# Patient Record
Sex: MALE | Race: WHITE | NOT HISPANIC OR LATINO | URBAN - METROPOLITAN AREA
[De-identification: names, ages, dates, MRNs, and addresses within clinical notes are randomized per-mention and may not be internally consistent; named-entity substitution may affect disease eponyms.]

---

## 2024-01-28 VITALS
WEIGHT: 205.03 LBS | DIASTOLIC BLOOD PRESSURE: 82 MMHG | SYSTOLIC BLOOD PRESSURE: 136 MMHG | RESPIRATION RATE: 16 BRPM | OXYGEN SATURATION: 96 % | TEMPERATURE: 99 F | HEART RATE: 97 BPM | HEIGHT: 72 IN

## 2024-01-28 LAB
ALBUMIN SERPL ELPH-MCNC: 4.1 G/DL — SIGNIFICANT CHANGE UP (ref 3.4–5)
ALP SERPL-CCNC: 74 U/L — SIGNIFICANT CHANGE UP (ref 40–120)
ALT FLD-CCNC: 22 U/L — SIGNIFICANT CHANGE UP (ref 12–42)
ANION GAP SERPL CALC-SCNC: 8 MMOL/L — LOW (ref 9–16)
APTT BLD: 25 SEC — SIGNIFICANT CHANGE UP (ref 24.5–35.6)
AST SERPL-CCNC: 22 U/L — SIGNIFICANT CHANGE UP (ref 15–37)
BASOPHILS # BLD AUTO: 0.06 K/UL — SIGNIFICANT CHANGE UP (ref 0–0.2)
BASOPHILS NFR BLD AUTO: 0.7 % — SIGNIFICANT CHANGE UP (ref 0–2)
BILIRUB SERPL-MCNC: 0.3 MG/DL — SIGNIFICANT CHANGE UP (ref 0.2–1.2)
BUN SERPL-MCNC: 23 MG/DL — SIGNIFICANT CHANGE UP (ref 7–23)
CALCIUM SERPL-MCNC: 9.2 MG/DL — SIGNIFICANT CHANGE UP (ref 8.5–10.5)
CHLORIDE SERPL-SCNC: 106 MMOL/L — SIGNIFICANT CHANGE UP (ref 96–108)
CO2 SERPL-SCNC: 27 MMOL/L — SIGNIFICANT CHANGE UP (ref 22–31)
CREAT SERPL-MCNC: 1.41 MG/DL — HIGH (ref 0.5–1.3)
EGFR: 69 ML/MIN/1.73M2 — SIGNIFICANT CHANGE UP
EOSINOPHIL # BLD AUTO: 0.06 K/UL — SIGNIFICANT CHANGE UP (ref 0–0.5)
EOSINOPHIL NFR BLD AUTO: 0.7 % — SIGNIFICANT CHANGE UP (ref 0–6)
GLUCOSE SERPL-MCNC: 86 MG/DL — SIGNIFICANT CHANGE UP (ref 70–99)
HCT VFR BLD CALC: 40 % — SIGNIFICANT CHANGE UP (ref 39–50)
HGB BLD-MCNC: 13.5 G/DL — SIGNIFICANT CHANGE UP (ref 13–17)
IMM GRANULOCYTES NFR BLD AUTO: 0.6 % — SIGNIFICANT CHANGE UP (ref 0–0.9)
INR BLD: 1.07 — SIGNIFICANT CHANGE UP (ref 0.85–1.18)
LYMPHOCYTES # BLD AUTO: 1.32 K/UL — SIGNIFICANT CHANGE UP (ref 1–3.3)
LYMPHOCYTES # BLD AUTO: 15.1 % — SIGNIFICANT CHANGE UP (ref 13–44)
MCHC RBC-ENTMCNC: 31 PG — SIGNIFICANT CHANGE UP (ref 27–34)
MCHC RBC-ENTMCNC: 33.8 GM/DL — SIGNIFICANT CHANGE UP (ref 32–36)
MCV RBC AUTO: 91.7 FL — SIGNIFICANT CHANGE UP (ref 80–100)
MONOCYTES # BLD AUTO: 0.91 K/UL — HIGH (ref 0–0.9)
MONOCYTES NFR BLD AUTO: 10.4 % — SIGNIFICANT CHANGE UP (ref 2–14)
NEUTROPHILS # BLD AUTO: 6.33 K/UL — SIGNIFICANT CHANGE UP (ref 1.8–7.4)
NEUTROPHILS NFR BLD AUTO: 72.5 % — SIGNIFICANT CHANGE UP (ref 43–77)
NRBC # BLD: 0 /100 WBCS — SIGNIFICANT CHANGE UP (ref 0–0)
PLATELET # BLD AUTO: 186 K/UL — SIGNIFICANT CHANGE UP (ref 150–400)
POTASSIUM SERPL-MCNC: 4 MMOL/L — SIGNIFICANT CHANGE UP (ref 3.5–5.3)
POTASSIUM SERPL-SCNC: 4 MMOL/L — SIGNIFICANT CHANGE UP (ref 3.5–5.3)
PROT SERPL-MCNC: 7.6 G/DL — SIGNIFICANT CHANGE UP (ref 6.4–8.2)
PROTHROM AB SERPL-ACNC: 11.7 SEC — SIGNIFICANT CHANGE UP (ref 9.5–13)
RBC # BLD: 4.36 M/UL — SIGNIFICANT CHANGE UP (ref 4.2–5.8)
RBC # FLD: 12.8 % — SIGNIFICANT CHANGE UP (ref 10.3–14.5)
SODIUM SERPL-SCNC: 141 MMOL/L — SIGNIFICANT CHANGE UP (ref 132–145)
WBC # BLD: 8.73 K/UL — SIGNIFICANT CHANGE UP (ref 3.8–10.5)
WBC # FLD AUTO: 8.73 K/UL — SIGNIFICANT CHANGE UP (ref 3.8–10.5)

## 2024-01-28 PROCEDURE — 73080 X-RAY EXAM OF ELBOW: CPT | Mod: 26,RT

## 2024-01-28 PROCEDURE — 73200 CT UPPER EXTREMITY W/O DYE: CPT | Mod: 26,RT

## 2024-01-28 PROCEDURE — 73060 X-RAY EXAM OF HUMERUS: CPT | Mod: 26,RT

## 2024-01-28 PROCEDURE — 99285 EMERGENCY DEPT VISIT HI MDM: CPT

## 2024-01-28 RX ORDER — MORPHINE SULFATE 50 MG/1
4 CAPSULE, EXTENDED RELEASE ORAL ONCE
Refills: 0 | Status: DISCONTINUED | OUTPATIENT
Start: 2024-01-28 | End: 2024-01-28

## 2024-01-28 RX ORDER — KETOROLAC TROMETHAMINE 30 MG/ML
30 SYRINGE (ML) INJECTION ONCE
Refills: 0 | Status: DISCONTINUED | OUTPATIENT
Start: 2024-01-28 | End: 2024-01-28

## 2024-01-28 RX ORDER — SODIUM CHLORIDE 9 MG/ML
1000 INJECTION INTRAMUSCULAR; INTRAVENOUS; SUBCUTANEOUS ONCE
Refills: 0 | Status: COMPLETED | OUTPATIENT
Start: 2024-01-28 | End: 2024-01-28

## 2024-01-28 RX ADMIN — Medication 30 MILLIGRAM(S): at 20:00

## 2024-01-28 RX ADMIN — SODIUM CHLORIDE 1000 MILLILITER(S): 9 INJECTION INTRAMUSCULAR; INTRAVENOUS; SUBCUTANEOUS at 23:24

## 2024-01-28 RX ADMIN — MORPHINE SULFATE 4 MILLIGRAM(S): 50 CAPSULE, EXTENDED RELEASE ORAL at 21:53

## 2024-01-28 RX ADMIN — MORPHINE SULFATE 4 MILLIGRAM(S): 50 CAPSULE, EXTENDED RELEASE ORAL at 22:42

## 2024-01-28 NOTE — ED PROVIDER NOTE - NS ED ATTENDING STATEMENT MOD
I have seen and examined this patient and fully participated in the care of this patient as the teaching attending.  The service was shared with the YESI.  I reviewed and verified the documentation.

## 2024-01-28 NOTE — ED PROVIDER NOTE - CLINICAL SUMMARY MEDICAL DECISION MAKING FREE TEXT BOX
29 yo male c/o right elbow injury occurred just prior to ED arrival. he reports he threw a dodge ball and felt like he dislocated his right elbow, unable to range his elbow. no other injuries. xrays ordered r/o fracture, analgesia ordered.      //xrays show right mid humerus shaft fracture displaced. closed. discussed with ortho on call Dr. Best, will obtain humerus xrays and CT, admit to regional ortho for surgical intervention, patient agrees with plan 27 yo male c/o right elbow injury occurred just prior to ED arrival. he reports he threw a dodge ball and felt like he dislocated his right elbow, unable to range his elbow. no other injuries. xrays ordered r/o fracture, analgesia ordered.      //xrays show right mid humerus shaft fracture displaced. closed. discussed with ortho on call Dr. Best, will obtain humerus xrays and CT, admit to regional ortho for surgical intervention, placed in posterior splint for transport. patient agrees with plan

## 2024-01-28 NOTE — ED PROVIDER NOTE - ATTENDING CONTRIBUTION TO CARE
I viewed the xray and CT images and there is a spiral humeral fracture.   I personally splinted the patient in long arm spling and admit to Dr Virk

## 2024-01-28 NOTE — H&P ADULT - HISTORY OF PRESENT ILLNESS
28M presented to [facility] ED with R elbow pain after throwing a dodge ball. Felt elbow snap during wind up. Has deformity and ecchymosis and unable to range his R elbow. Denies numbness/tingling.   28M presented to [place] ED with R elbow pain after throwing a dodge ball. Felt elbow snap during wind up. No deformity or ecchymosis, but unable to range his R elbow. Denies numbness/tingling.   28M presented to [facility] ED with R elbow pain after throwing a dodge ball. Felt elbow snap during follow through. No deformity or ecchymosis, but unable to range his R elbow. Denies numbness/tingling.

## 2024-01-28 NOTE — H&P ADULT - ASSESSMENT
A/P: 28M with displaced distal third humeral shaft fracture    -NWB RUE  -Posterior slab splint  -Pre-op and add-on for distal humerus ORIF

## 2024-01-28 NOTE — ED PROVIDER NOTE - OBJECTIVE STATEMENT
27 yo female c/o right elbow injury occurred just prior to ED arrival. he reports he threw a dodge ball and felt like he dislocated his right elbow, unable to range his elbow. 27 yo male c/o right elbow injury occurred just prior to ED arrival. he reports he threw a dodge ball and felt like he dislocated his right elbow, unable to range his elbow. no other injuries.

## 2024-01-28 NOTE — H&P ADULT - NSHPPHYSICALEXAM_GEN_ALL_CORE
CONSTITUTIONAL: Well-appearing; well-nourished; in no apparent distress.   EYES:  conjunctiva and sclera clear  ENT: normal nose; no rhinorrhea; normal pharynx with no erythema or lesions.   NECK: Supple; non-tender;   CARDIOVASCULAR: Normal S1, S2; no murmurs, rubs, or gallops. Regular rate and rhythm.   RESPIRATORY: Breathing easily; breath sounds clear and equal bilaterally; no wheezes, rhonchi, or rales.  GI: soft nondistended nontender.  SKIN: Normal for age and race; warm; dry; good turgor; no apparent lesions or rash.   NEURO: A & O x 3; face symmetric; grossly unremarkable.   PSYCHOLOGICAL: The patient’s mood and manner are appropriate.    RUE: normal appearance, unable to range his elbow, +ttp elbow and humerus. Soft compartments. no hand/wrist or shoulder tenderness. no sensory defiicts. distal pulses intact.

## 2024-01-28 NOTE — ED ADULT NURSE NOTE - NSFALLUNIVINTERV_ED_ALL_ED
Bed/Stretcher in lowest position, wheels locked, appropriate side rails in place/Call bell, personal items and telephone in reach/Instruct patient to call for assistance before getting out of bed/chair/stretcher/Non-slip footwear applied when patient is off stretcher/[place] to call system/Physically safe environment - no spills, clutter or unnecessary equipment/Purposeful proactive rounding/Room/bathroom lighting operational, light cord in reach

## 2024-01-28 NOTE — ED PROVIDER NOTE - PHYSICAL EXAMINATION
CONSTITUTIONAL: Well-appearing; well-nourished; in no apparent distress.   	HEAD: Normocephalic; atraumatic.   	RUE: normal appearance, unable to range his elbow, +ttp elbow, dpi. soft compartments. no hand/wrist or shoulder tenderness.   	NEURO: A & O x 3; face symmetric; grossly unremarkable.   PSYCHOLOGICAL: The patient’s mood and manner are appropriate. CONSTITUTIONAL: Well-appearing; well-nourished; in no apparent distress.   	EYES:  conjunctiva and sclera clear  	ENT: normal nose; no rhinorrhea; normal pharynx with no erythema or lesions.   	NECK: Supple; non-tender;   	CARDIOVASCULAR: Normal S1, S2; no murmurs, rubs, or gallops. Regular rate and rhythm.   	RESPIRATORY: Breathing easily; breath sounds clear and equal bilaterally; no wheezes, rhonchi, or rales.  	GI: soft nondistended nontender.  Back: no midline tenderness or bony stepoffs.   RUE: normal appearance, unable to range his elbow, +ttp elbow and humerus, dpi. soft compartments. no hand/wrist or shoulder tenderness. no sensory defiicts.   	SKIN: Normal for age and race; warm; dry; good turgor; no apparent lesions or rash.   	NEURO: A & O x 3; face symmetric; grossly unremarkable.   PSYCHOLOGICAL: The patient’s mood and manner are appropriate.

## 2024-01-28 NOTE — ED ADULT NURSE NOTE - OBJECTIVE STATEMENT
Pt presents to ED A&Ox4 with c/o 10/10 right elbow pain. Pt states sustaining injury while playing dodgeball. CMS intact in the right upper extremity. Pt unable to utilize full ROM. Pt presents to ED A&Ox4 with c/o 10/10 right elbow pain. Pt states sustaining injury while playing dodgeball. CMS intact in right upper extremity. Pt unable to utilize full ROM of right elbow. Upon observation, no visible deformities, redness, or bruising is present in right elbow and no other injuries are present.

## 2024-01-29 ENCOUNTER — INPATIENT (INPATIENT)
Facility: HOSPITAL | Age: 29
LOS: 0 days | Discharge: HOME | DRG: 493 | End: 2024-01-29
Attending: ORTHOPAEDIC SURGERY | Admitting: ORTHOPAEDIC SURGERY
Payer: COMMERCIAL

## 2024-01-29 VITALS
OXYGEN SATURATION: 97 % | HEART RATE: 62 BPM | RESPIRATION RATE: 17 BRPM | TEMPERATURE: 98 F | DIASTOLIC BLOOD PRESSURE: 59 MMHG | SYSTOLIC BLOOD PRESSURE: 113 MMHG

## 2024-01-29 DIAGNOSIS — N17.9 ACUTE KIDNEY FAILURE, UNSPECIFIED: ICD-10-CM

## 2024-01-29 DIAGNOSIS — Z01.818 ENCOUNTER FOR OTHER PREPROCEDURAL EXAMINATION: ICD-10-CM

## 2024-01-29 LAB
ANION GAP SERPL CALC-SCNC: 8 MMOL/L — SIGNIFICANT CHANGE UP (ref 5–17)
APPEARANCE UR: CLEAR — SIGNIFICANT CHANGE UP
APTT BLD: 26.1 SEC — SIGNIFICANT CHANGE UP (ref 24.5–35.6)
BILIRUB UR-MCNC: NEGATIVE — SIGNIFICANT CHANGE UP
BLD GP AB SCN SERPL QL: NEGATIVE — SIGNIFICANT CHANGE UP
BLD GP AB SCN SERPL QL: NEGATIVE — SIGNIFICANT CHANGE UP
BUN SERPL-MCNC: 24 MG/DL — HIGH (ref 7–23)
CALCIUM SERPL-MCNC: 8.9 MG/DL — SIGNIFICANT CHANGE UP (ref 8.4–10.5)
CHLORIDE SERPL-SCNC: 105 MMOL/L — SIGNIFICANT CHANGE UP (ref 96–108)
CO2 SERPL-SCNC: 26 MMOL/L — SIGNIFICANT CHANGE UP (ref 22–31)
COLOR SPEC: YELLOW — SIGNIFICANT CHANGE UP
CREAT SERPL-MCNC: 1.16 MG/DL — SIGNIFICANT CHANGE UP (ref 0.5–1.3)
DIFF PNL FLD: NEGATIVE — SIGNIFICANT CHANGE UP
EGFR: 87 ML/MIN/1.73M2 — SIGNIFICANT CHANGE UP
GLUCOSE SERPL-MCNC: 137 MG/DL — HIGH (ref 70–99)
GLUCOSE UR QL: NEGATIVE MG/DL — SIGNIFICANT CHANGE UP
HCT VFR BLD CALC: 34.6 % — LOW (ref 39–50)
HGB BLD-MCNC: 12 G/DL — LOW (ref 13–17)
INR BLD: 1.04 — SIGNIFICANT CHANGE UP (ref 0.85–1.18)
KETONES UR-MCNC: ABNORMAL MG/DL
LEUKOCYTE ESTERASE UR-ACNC: NEGATIVE — SIGNIFICANT CHANGE UP
MCHC RBC-ENTMCNC: 30.8 PG — SIGNIFICANT CHANGE UP (ref 27–34)
MCHC RBC-ENTMCNC: 34.7 GM/DL — SIGNIFICANT CHANGE UP (ref 32–36)
MCV RBC AUTO: 88.9 FL — SIGNIFICANT CHANGE UP (ref 80–100)
NITRITE UR-MCNC: NEGATIVE — SIGNIFICANT CHANGE UP
NRBC # BLD: 0 /100 WBCS — SIGNIFICANT CHANGE UP (ref 0–0)
PH UR: 6 — SIGNIFICANT CHANGE UP (ref 5–8)
PLATELET # BLD AUTO: 163 K/UL — SIGNIFICANT CHANGE UP (ref 150–400)
POTASSIUM SERPL-MCNC: 3.7 MMOL/L — SIGNIFICANT CHANGE UP (ref 3.5–5.3)
POTASSIUM SERPL-SCNC: 3.7 MMOL/L — SIGNIFICANT CHANGE UP (ref 3.5–5.3)
PROT UR-MCNC: NEGATIVE MG/DL — SIGNIFICANT CHANGE UP
PROTHROM AB SERPL-ACNC: 11.8 SEC — SIGNIFICANT CHANGE UP (ref 9.5–13)
RBC # BLD: 3.89 M/UL — LOW (ref 4.2–5.8)
RBC # FLD: 13 % — SIGNIFICANT CHANGE UP (ref 10.3–14.5)
RH IG SCN BLD-IMP: NEGATIVE — SIGNIFICANT CHANGE UP
RH IG SCN BLD-IMP: NEGATIVE — SIGNIFICANT CHANGE UP
SODIUM SERPL-SCNC: 139 MMOL/L — SIGNIFICANT CHANGE UP (ref 135–145)
SP GR SPEC: >1.03 — HIGH (ref 1–1.03)
T3 SERPL-MCNC: 95 NG/DL — SIGNIFICANT CHANGE UP (ref 80–200)
T4 AB SER-ACNC: 4.17 UG/DL — LOW (ref 4.5–11.7)
TSH SERPL-MCNC: 1.94 UIU/ML — SIGNIFICANT CHANGE UP (ref 0.27–4.2)
UROBILINOGEN FLD QL: 1 MG/DL — SIGNIFICANT CHANGE UP (ref 0.2–1)
WBC # BLD: 10.98 K/UL — HIGH (ref 3.8–10.5)
WBC # FLD AUTO: 10.98 K/UL — HIGH (ref 3.8–10.5)

## 2024-01-29 PROCEDURE — 73080 X-RAY EXAM OF ELBOW: CPT

## 2024-01-29 PROCEDURE — 29105 APPLICATION LONG ARM SPLINT: CPT | Mod: RT

## 2024-01-29 PROCEDURE — 86901 BLOOD TYPING SEROLOGIC RH(D): CPT

## 2024-01-29 PROCEDURE — 85610 PROTHROMBIN TIME: CPT

## 2024-01-29 PROCEDURE — 73080 X-RAY EXAM OF ELBOW: CPT | Mod: 26,RT

## 2024-01-29 PROCEDURE — 86900 BLOOD TYPING SEROLOGIC ABO: CPT

## 2024-01-29 PROCEDURE — 76000 FLUOROSCOPY <1 HR PHYS/QHP: CPT

## 2024-01-29 PROCEDURE — 36415 COLL VENOUS BLD VENIPUNCTURE: CPT

## 2024-01-29 PROCEDURE — 86850 RBC ANTIBODY SCREEN: CPT

## 2024-01-29 PROCEDURE — 73060 X-RAY EXAM OF HUMERUS: CPT

## 2024-01-29 PROCEDURE — 99285 EMERGENCY DEPT VISIT HI MDM: CPT

## 2024-01-29 PROCEDURE — 73200 CT UPPER EXTREMITY W/O DYE: CPT

## 2024-01-29 PROCEDURE — C1713: CPT

## 2024-01-29 PROCEDURE — 84436 ASSAY OF TOTAL THYROXINE: CPT

## 2024-01-29 PROCEDURE — 84443 ASSAY THYROID STIM HORMONE: CPT

## 2024-01-29 PROCEDURE — 81003 URINALYSIS AUTO W/O SCOPE: CPT

## 2024-01-29 PROCEDURE — 99223 1ST HOSP IP/OBS HIGH 75: CPT

## 2024-01-29 PROCEDURE — 85025 COMPLETE CBC W/AUTO DIFF WBC: CPT

## 2024-01-29 PROCEDURE — 85730 THROMBOPLASTIN TIME PARTIAL: CPT

## 2024-01-29 PROCEDURE — 85027 COMPLETE CBC AUTOMATED: CPT

## 2024-01-29 PROCEDURE — 84480 ASSAY TRIIODOTHYRONINE (T3): CPT

## 2024-01-29 PROCEDURE — 80048 BASIC METABOLIC PNL TOTAL CA: CPT

## 2024-01-29 PROCEDURE — 96372 THER/PROPH/DIAG INJ SC/IM: CPT

## 2024-01-29 PROCEDURE — 80053 COMPREHEN METABOLIC PANEL: CPT

## 2024-01-29 PROCEDURE — 73060 X-RAY EXAM OF HUMERUS: CPT | Mod: 26,RT

## 2024-01-29 DEVICE — SCREW CTX EVOS 3.5X14MM: Type: IMPLANTABLE DEVICE | Status: FUNCTIONAL

## 2024-01-29 DEVICE — SCREW EVOS CTX 3.5X22MM: Type: IMPLANTABLE DEVICE | Status: FUNCTIONAL

## 2024-01-29 DEVICE — GUIDE PIN SHARP 1.6MM X 160MM: Type: IMPLANTABLE DEVICE | Status: FUNCTIONAL

## 2024-01-29 DEVICE — SCREW EVOS CTX 3.5X26MM: Type: IMPLANTABLE DEVICE | Status: FUNCTIONAL

## 2024-01-29 DEVICE — SCREW EVOS CTX 3.5X24MM: Type: IMPLANTABLE DEVICE | Status: FUNCTIONAL

## 2024-01-29 DEVICE — IMPLANTABLE DEVICE: Type: IMPLANTABLE DEVICE | Status: FUNCTIONAL

## 2024-01-29 DEVICE — SCREW CTX EVOS 3.5X28MM: Type: IMPLANTABLE DEVICE | Status: FUNCTIONAL

## 2024-01-29 DEVICE — SCREW EVOS CTX 3.5X20MM STRL: Type: IMPLANTABLE DEVICE | Status: FUNCTIONAL

## 2024-01-29 RX ORDER — PROCHLORPERAZINE MALEATE 5 MG
5 TABLET ORAL ONCE
Refills: 0 | Status: DISCONTINUED | OUTPATIENT
Start: 2024-01-29 | End: 2024-01-29

## 2024-01-29 RX ORDER — CEFAZOLIN SODIUM 1 G
2000 VIAL (EA) INJECTION EVERY 8 HOURS
Refills: 0 | Status: COMPLETED | OUTPATIENT
Start: 2024-01-29 | End: 2024-01-30

## 2024-01-29 RX ORDER — OXYCODONE HYDROCHLORIDE 5 MG/1
5 TABLET ORAL
Refills: 0 | Status: DISCONTINUED | OUTPATIENT
Start: 2024-01-29 | End: 2024-01-29

## 2024-01-29 RX ORDER — HYDROMORPHONE HYDROCHLORIDE 2 MG/ML
0.5 INJECTION INTRAMUSCULAR; INTRAVENOUS; SUBCUTANEOUS
Refills: 0 | Status: DISCONTINUED | OUTPATIENT
Start: 2024-01-29 | End: 2024-01-29

## 2024-01-29 RX ORDER — OXYCODONE HYDROCHLORIDE 5 MG/1
5 TABLET ORAL EVERY 4 HOURS
Refills: 0 | Status: DISCONTINUED | OUTPATIENT
Start: 2024-01-29 | End: 2024-01-29

## 2024-01-29 RX ORDER — OXYCODONE HYDROCHLORIDE 5 MG/1
10 TABLET ORAL EVERY 4 HOURS
Refills: 0 | Status: DISCONTINUED | OUTPATIENT
Start: 2024-01-29 | End: 2024-01-29

## 2024-01-29 RX ORDER — OXYCODONE HYDROCHLORIDE 5 MG/1
10 TABLET ORAL
Refills: 0 | Status: DISCONTINUED | OUTPATIENT
Start: 2024-01-29 | End: 2024-01-29

## 2024-01-29 RX ORDER — SENNA PLUS 8.6 MG/1
2 TABLET ORAL AT BEDTIME
Refills: 0 | Status: DISCONTINUED | OUTPATIENT
Start: 2024-01-29 | End: 2024-01-29

## 2024-01-29 RX ORDER — SENNA PLUS 8.6 MG/1
2 TABLET ORAL
Qty: 60 | Refills: 0
Start: 2024-01-29 | End: 2024-02-27

## 2024-01-29 RX ORDER — SODIUM CHLORIDE 9 MG/ML
1000 INJECTION, SOLUTION INTRAVENOUS
Refills: 0 | Status: DISCONTINUED | OUTPATIENT
Start: 2024-01-29 | End: 2024-01-29

## 2024-01-29 RX ORDER — POVIDONE-IODINE 5 %
1 AEROSOL (ML) TOPICAL ONCE
Refills: 0 | Status: DISCONTINUED | OUTPATIENT
Start: 2024-01-29 | End: 2024-01-29

## 2024-01-29 RX ORDER — POLYETHYLENE GLYCOL 3350 17 G/17G
17 POWDER, FOR SOLUTION ORAL AT BEDTIME
Refills: 0 | Status: DISCONTINUED | OUTPATIENT
Start: 2024-01-29 | End: 2024-01-29

## 2024-01-29 RX ORDER — ONDANSETRON 8 MG/1
8 TABLET, FILM COATED ORAL EVERY 8 HOURS
Refills: 0 | Status: DISCONTINUED | OUTPATIENT
Start: 2024-01-29 | End: 2024-01-29

## 2024-01-29 RX ORDER — PANTOPRAZOLE SODIUM 20 MG/1
40 TABLET, DELAYED RELEASE ORAL
Refills: 0 | Status: DISCONTINUED | OUTPATIENT
Start: 2024-01-29 | End: 2024-01-29

## 2024-01-29 RX ORDER — CHLORHEXIDINE GLUCONATE 213 G/1000ML
1 SOLUTION TOPICAL EVERY 12 HOURS
Refills: 0 | Status: DISCONTINUED | OUTPATIENT
Start: 2024-01-29 | End: 2024-01-29

## 2024-01-29 RX ORDER — INFLUENZA VIRUS VACCINE 15; 15; 15; 15 UG/.5ML; UG/.5ML; UG/.5ML; UG/.5ML
0.5 SUSPENSION INTRAMUSCULAR ONCE
Refills: 0 | Status: DISCONTINUED | OUTPATIENT
Start: 2024-01-29 | End: 2024-01-29

## 2024-01-29 RX ORDER — ACETAMINOPHEN 500 MG
1000 TABLET ORAL EVERY 6 HOURS
Refills: 0 | Status: DISCONTINUED | OUTPATIENT
Start: 2024-01-29 | End: 2024-01-29

## 2024-01-29 RX ORDER — OXYCODONE HYDROCHLORIDE 5 MG/1
1 TABLET ORAL
Qty: 28 | Refills: 0
Start: 2024-01-29

## 2024-01-29 RX ADMIN — HYDROMORPHONE HYDROCHLORIDE 0.5 MILLIGRAM(S): 2 INJECTION INTRAMUSCULAR; INTRAVENOUS; SUBCUTANEOUS at 18:58

## 2024-01-29 RX ADMIN — HYDROMORPHONE HYDROCHLORIDE 0.5 MILLIGRAM(S): 2 INJECTION INTRAMUSCULAR; INTRAVENOUS; SUBCUTANEOUS at 18:38

## 2024-01-29 RX ADMIN — HYDROMORPHONE HYDROCHLORIDE 0.5 MILLIGRAM(S): 2 INJECTION INTRAMUSCULAR; INTRAVENOUS; SUBCUTANEOUS at 18:07

## 2024-01-29 RX ADMIN — OXYCODONE HYDROCHLORIDE 10 MILLIGRAM(S): 5 TABLET ORAL at 10:58

## 2024-01-29 RX ADMIN — OXYCODONE HYDROCHLORIDE 10 MILLIGRAM(S): 5 TABLET ORAL at 05:54

## 2024-01-29 RX ADMIN — OXYCODONE HYDROCHLORIDE 10 MILLIGRAM(S): 5 TABLET ORAL at 06:54

## 2024-01-29 RX ADMIN — OXYCODONE HYDROCHLORIDE 10 MILLIGRAM(S): 5 TABLET ORAL at 09:58

## 2024-01-29 RX ADMIN — SODIUM CHLORIDE 150 MILLILITER(S): 9 INJECTION, SOLUTION INTRAVENOUS at 05:20

## 2024-01-29 RX ADMIN — OXYCODONE HYDROCHLORIDE 10 MILLIGRAM(S): 5 TABLET ORAL at 01:58

## 2024-01-29 RX ADMIN — OXYCODONE HYDROCHLORIDE 10 MILLIGRAM(S): 5 TABLET ORAL at 02:58

## 2024-01-29 RX ADMIN — CHLORHEXIDINE GLUCONATE 1 APPLICATION(S): 213 SOLUTION TOPICAL at 05:20

## 2024-01-29 NOTE — DISCHARGE NOTE NURSING/CASE MANAGEMENT/SOCIAL WORK - NSDCPEFALRISK_GEN_ALL_CORE
For information on Fall & Injury Prevention, visit: [email or web address] OR  [email or web address] OR  https://www.cdc.gov/steadi/patient.html

## 2024-01-29 NOTE — DISCHARGE NOTE PROVIDER - HOSPITAL COURSE
Admitted to Orthopedic service on 1/28/24  Surgery- right humerus ORIF  Kenia-op Antibiotics  Pain control  DVT prophylaxis  OOB/Physical Therapy

## 2024-01-29 NOTE — DISCHARGE NOTE NURSING/CASE MANAGEMENT/SOCIAL WORK - PATIENT PORTAL LINK FT
You can access the FollowMyHealth Patient Portal offered by [facility] by registering at the following website: [email or web address]. By joining [employer]’s FollowMyHealth portal, you will also be able to view your health information using other applications (apps) compatible with our system.

## 2024-01-29 NOTE — DISCHARGE NOTE PROVIDER - CARE PROVIDER_API CALL
Sylvester Best  Orthopaedic Surgery  [address] [place] [address], [address]  [place] [address]  Phone: [phone]  Fax: [phone]  Follow Up Time: 2 weeks

## 2024-01-29 NOTE — DISCHARGE NOTE PROVIDER - NSDCMRMEDTOKEN_GEN_ALL_CORE_FT
oxyCODONE 5 mg oral tablet: 1 tab(s) orally every 6 hours as needed for  severe pain MDD: 4  Senna 8.6 mg oral tablet: 2 tab(s) orally once a day (at bedtime)

## 2024-01-29 NOTE — PRE-ANESTHESIA EVALUATION ADULT - NSANTHPMHFT_GEN_ALL_CORE
29 yo male who presented with a R distal third humeral shaft fracture, for ORIF    PAST MEDICAL/SURGICAL HISTORY  ADHD  PAST MEDICAL & SURGICAL HISTORY:  R ACL repair

## 2024-01-29 NOTE — PATIENT PROFILE ADULT - FALL HARM RISK - RISK INTERVENTIONS
Assistance OOB with selected safe patient handling equipment/Assistance with ambulation/Monitor gait and stability/Use of alarms - bed, chair and/or voice tab/Visual Cue: Yellow wristband/Bed in lowest position, wheels locked, appropriate side rails in place/Call bell, personal items and telephone in reach/Instruct patient to call for assistance before getting out of bed or chair/Non-slip footwear when patient is out of bed/[place] to call system/Physically safe environment - no spills, clutter or unnecessary equipment/Purposeful Proactive Rounding/Room/bathroom lighting operational, light cord in reach

## 2024-01-29 NOTE — PROGRESS NOTE ADULT - SUBJECTIVE AND OBJECTIVE BOX
Pre-op for for right humerus ORIF for distal one-third humerus shaft fracture  SUBJECTIVE: Patient seen and examined.  Pt without complaints. Resting comfortably in bed awaiting OR. Notes pain is well controlled as long as he does not move his arm. Denies chest pain/SOB/dizziness/n/v/HA   Pain well controlled.       OBJECTIVE:   Vital Signs Last 24 Hrs  T(C): 36.6 (29 Jan 2024 08:29), Max: 37.1 (28 Jan 2024 19:40)  T(F): 97.8 (29 Jan 2024 08:29), Max: 98.7 (28 Jan 2024 19:40)  HR: 72 (29 Jan 2024 08:29) (68 - 97)  BP: 114/57 (29 Jan 2024 08:29) (112/66 - 141/81)  BP(mean): 76 (29 Jan 2024 08:29) (76 - 76)  RR: 16 (29 Jan 2024 08:29) (16 - 18)  SpO2: 98% (29 Jan 2024 08:29) (96% - 98%)  Parameters below as of 29 Jan 2024 08:29  Patient On (Oxygen Delivery Method): room air    PE:  Comfortable, NAD, alert, oriented, pleasant         RUE splint in place, clean and dry, elevating with pillows, wiggling all fingers        Sensation: intact to light touch to patient's baseline M/R/U nerves         Motor exam:  firing ain/pin/u nerves intact BUE,        Skin warm, well-perfused; capillary refill brisk BUE          LABS:                        12.0   10.98 )-----------( 163      ( 29 Jan 2024 01:30 )             34.6     01-29    139  |  105  |  24<H>  ----------------------------<  137<H>  3.7   |  26  |  1.16    Ca    8.9      29 Jan 2024 01:30  TPro  7.6  /  Alb  4.1  /  TBili  0.3  /  DBili  x   /  AST  22  /  ALT  22  /  AlkPhos  74  01-28  PT/INR - ( 29 Jan 2024 01:30 )   PT: 11.8 sec;   INR: 1.04     PTT - ( 29 Jan 2024 01:30 )  PTT:26.1 sec  Urinalysis Basic - ( 29 Jan 2024 01:48 )    Color: Yellow / Appearance: Clear / SG: >1.030 / pH: x  Gluc: x / Ketone: Trace mg/dL  / Bili: Negative / Urobili: 1.0 mg/dL   Blood: x / Protein: Negative mg/dL / Nitrite: Negative   Leuk Esterase: Negative / RBC: x / WBC x   Sq Epi: x / Non Sq Epi: x / Bacteria: x      ASSESSMENT AND PLAN: Preop for right distal humerus ORIF today with Dr. Best   1. Added on to OR for today with Dr. Best, Medically optimized, NPO since midnight  2. Analgesic pain control  3. DVT prophylaxis: SCDs  4. Weight Bearing Status:  NWB RUE   5. Disposition: pending OR today

## 2024-01-29 NOTE — DISCHARGE NOTE PROVIDER - NSDCFUADDINST_GEN_ALL_CORE_FT
ACTIVITY:   - You are non weightbearing on your right upper extremity in sling. No strenuous activity, heavy lifting, driving or returning to work until cleared by MD.   - Apply a cold compress to the surgical site several times daily to reduce pain and swelling. For icing, twenty-minute sessions followed by an hour off is recommended. You should ice as frequently as possible. Ice should NEVER be placed directly on the skin. Wearing compression stockings during the first week after surgery can help reduce swelling in your knee, calf and foot, but is not required.     DRESSING/SHOWERING:   (AQUACEL – brown gel dressing)   - You may shower, your dressing is water-resistant. Do not soak in bathtubs. Remove dressing after postop day 7, then leave incision open to air. You may do this yourself (simply peel it off), or you may ask for assistance from your visiting nurse. Keep your incision clean and dry. Do not pick at your incision. Do not apply creams, ointments or oils to your incision until cleared by your surgeon. Do not soak your incision in sitting water (ie tubs, pools, lakes, etc.) until cleared by your surgeon. Do not scrub the incision – instead, allow soap and water to flow over the incision and then pat it dry with a clean towel.   MEDICATION/ANTICOAGULATION:   - You have been prescribed medications for pain:     - Tylenol for mild to moderate pain. Do not exceed 3,000mg daily.     - For more severe pain, take Tylenol with the addition of narcotic pain medication. Take this medication as prescribed. This medication may cause drowsiness or dizziness. Do not operate machinery. This medication may cause constipation.   - For any additional medications, follow instructions on the bottle.    -Try to have regular bowel movements. Take stool softener or laxative if necessary. You may wish to take Miralax daily until you have regular bowel movements.    - If you have been prescribed Aspirin or an anti-inflammatory, please take prilosec (omeprazole) once a day, before breakfast, until no longer taking Aspirin or anti-inflammatory. This will help protect your stomach.   - If you have a pain management physician, please follow-up with them postoperatively.    - If you experience any negative side effects of your medications, please call your surgeon's office to discuss.      FOLLOW-UP:   - Call to schedule an appt with Dr. Best for follow up.   - Please follow-up with your primary care physician or any other specialist you see postoperatively, if needed.    - Contact your doctor or go to the emergency room if you experience: fever greater than 101.5, chills, chest pain, difficulty breathing, redness or excessive drainage around the incision, other concerns.

## 2024-01-29 NOTE — CONSULT NOTE ADULT - ATTENDING COMMENTS
29 yo M with no significant PMHx px to [facility] from home following injury while playing dodgeball found to have displaced distal humeral shaft fracture admitted to orthopedic surgery with plan for OR 1/29. Medicine consulted for pre-operative clearance.     #Pre-operative clearance – Consulted for OR 1/29 for R displaced humerus fracture. METS >4. No significant cardiac/pulmonary history. No adverse reactions to anesthesia in the past in self or first-degree relatives. RCRI 0 points (Class I Risk) ~ 3.9% for 30d risk of death, MI, or cardiac arrest. Mishra score 0.0% for risk of MI or cardiac arrest, intraoperatively or up to 30d post-op. Low-risk for intermediate-risk procedure.     #RICHARDSON – Initial Cr 1.41, improved to Cr 1.16 s/p 1L IVF. Likely pre-renal RICHARDSON.      Agree with remainder of resident plan as above.

## 2024-01-29 NOTE — PATIENT PROFILE ADULT - AGENT'S NAME
Opioid Pregnancy And Lactation Text: These medications can lead to premature delivery and should be avoided during pregnancy. These medications are also present in breast milk in small amounts. Lloyd Murray

## 2024-01-29 NOTE — CONSULT NOTE ADULT - ASSESSMENT
28M w/ PMH of ADHD presented w/ RUE pain and found to have R humeral fracture.    #Pre-op evaluation   Surgery: R humeral fracture fix. No personal or family Hx of anesthesia allergies or reactions. No family or personal Hx of prolonged bleeding or unusual bruising. Pt has no Hx of blood transfusion.    - METS >4  - RCRI - 0 points - Class I Risk - 3.9% 30-day risk of death, MI, or cardiac arrest   - Mishra score - 0.1% risk of MI or cardiac arrest, intraoperatively or up to 30 days post-op  - Patient deemed low risk for low risk surgery   28M w/ PMH of ADHD presented w/ RUE pain and found to have R humeral fracture.    #Pre-op evaluation   Surgery: R humeral fracture fix. No personal or family Hx of anesthesia allergies or reactions. No family or personal Hx of prolonged bleeding or unusual bruising. Pt has no Hx of blood transfusion.    - METS >4  - RCRI - 0 points - Class I Risk - 3.9% 30-day risk of death, MI, or cardiac arrest   - Mishra score - 0.1% risk of MI or cardiac arrest, intraoperatively or up to 30 days post-op  - Patient deemed low risk for low risk surgery    #RICHARDSON  Patient noted to have RICHARDSON with Cr 1.41 -> 1.16. Likely prerenal etiology since it resolved after IVF.  - s/p 1 L IVF  - trend Cr    #Leukocytosis  WCC 10.98. Likely reactive ISO fracture  - trend WCC    Plan discussed w/ attending Dr Koch.

## 2024-01-29 NOTE — CONSULT NOTE ADULT - SUBJECTIVE AND OBJECTIVE BOX
Patient is a 28y old  Male who presents with a chief complaint of R distal third humeral shaft fracture (28 Jan 2024 23:49)    PAST MEDICAL/SURGICAL HISTORY  ADHD  PAST MEDICAL & SURGICAL HISTORY:  R ACL repair    REVIEW OF SYSTEMS:  CONSTITUTIONAL: No fever, weight loss, or fatigue  EYES: No eye pain, visual disturbances, or discharge  ENMT:  No difficulty hearing, tinnitus, vertigo; No sinus or throat pain  NECK: No pain or stiffness  BREASTS: No pain, masses, or nipple discharge  RESPIRATORY: No cough, wheezing, chills or hemoptysis; No shortness of breath  CARDIOVASCULAR: No chest pain, palpitations, dizziness, or leg swelling  GASTROINTESTINAL: No abdominal or epigastric pain. No nausea, vomiting, or hematemesis; No diarrhea or constipation. No melena or hematochezia.  GENITOURINARY: No dysuria, frequency, hematuria, or incontinence  NEUROLOGICAL: No headaches, memory loss, loss of strength, numbness, or tremors  SKIN: No itching, burning, rashes, or lesions   LYMPH NODES: No enlarged glands  ENDOCRINE: No heat or cold intolerance; No hair loss  MUSCULOSKELETAL: R arm pain  PSYCHIATRIC: No depression, anxiety, mood swings, or difficulty sleeping  HEME/LYMPH: No easy bruising, or bleeding gums  ALLERY AND IMMUNOLOGIC: No hives or eczema    T(C): 36.8 (01-29-24 @ 01:16), Max: 37.1 (01-28-24 @ 19:40)  HR: 79 (01-29-24 @ 01:16) (79 - 97)  BP: 116/69 (01-29-24 @ 01:16) (116/69 - 141/81)  RR: 16 (01-29-24 @ 01:16) (16 - 16)  SpO2: 97% (01-29-24 @ 01:16) (96% - 98%)  Wt(kg): --Vital Signs Last 24 Hrs  T(C): 36.8 (29 Jan 2024 01:16), Max: 37.1 (28 Jan 2024 19:40)  T(F): 98.2 (29 Jan 2024 01:16), Max: 98.7 (28 Jan 2024 19:40)  HR: 79 (29 Jan 2024 01:16) (79 - 97)  BP: 116/69 (29 Jan 2024 01:16) (116/69 - 141/81)  BP(mean): --  RR: 16 (29 Jan 2024 01:16) (16 - 16)  SpO2: 97% (29 Jan 2024 01:16) (96% - 98%)    Parameters below as of 29 Jan 2024 01:16  Patient On (Oxygen Delivery Method): room air    PHYSICAL EXAM:  GENERAL: NAD, well-groomed, well-developed  EYES: EOMI, PERRLA, conjunctiva and sclera clear  ENMT: No tonsillar erythema, exudates, or enlargement; Moist mucous membranes, Good dentition, No lesions  NECK: Supple, No JVD, Normal thyroid  NERVOUS SYSTEM:  Alert & Oriented X3, Good concentration; Motor Strength 5/5 B/L upper and lower extremities; DTRs 2+ intact and symmetric  CHEST/LUNG: Clear to percussion bilaterally; No rales, rhonchi, wheezing, or rubs  HEART: Regular rate and rhythm; No murmurs, rubs, or gallops  ABDOMEN: Soft, Nontender, Nondistended; Bowel sounds present  EXTREMITIES:  2+ Peripheral Pulses, No clubbing, cyanosis, or edema, RUE in a sling and wrapped in ACE bandage  LYMPH: No lymphadenopathy noted  SKIN: No rashes or lesions    Consultant(s) Notes Reviewed:  [x ] YES  [ ] NO  Care Discussed with Consultants/Other Providers [ x] YES  [ ] NO    LABS:  CBC   01-29-24 @ 01:30  Hematcorit 34.6  Hemoglobin 12.0  Mean Cell Hemoglobin 30.8  Platelet Count-Automated 163  RBC Count 3.89  Red Cell Distrib Width 13.0  Wbc Count 10.98  01-28-24 @ 21:57  Hematcorit 40.0  Hemoglobin 13.5  Mean Cell Hemoglobin 31.0  Platelet Count-Automated 186  RBC Count 4.36  Red Cell Distrib Width 12.8  Wbc Count 8.73    BMP  01-29-24 @ 01:30  Anion Gap. Serum 8  Blood Urea Nitrogen,Serm 24  Calcium, Total Serum 8.9  Carbon Dioxide, Serum 26  Chloride, Serum 105  Creatinine, Serum 1.16  eGFR in  --  eGFR in Non Afican American --  Gloucose, serum 137  Potassium, Serum 3.7  Sodium, Serum 139    01-28-24 @ 21:57  Anion Gap. Serum 8  Blood Urea Nitrogen,Serm 23  Calcium, Total Serum 9.2  Carbon Dioxide, Serum 27  Chloride, Serum 106  Creatinine, Serum 1.41  eGFR in  --  eGFR in Non Afican American --  Gloucose, serum 86  Potassium, Serum 4.0  Sodium, Serum 141    CMP  01-29-24 @ 01:30  Nessa Aminotransferase(ALT/SGPT)--  Albumin, Serum --  Alkaline Phosphatase, Serum --  Anion Gap, Serum 8  Aspartate Aminotransferase (AST/SGOT)--  Bilirubin Total, Serum --  Blood Urea Nitrogen, Serum 24  Calcium,Total Serum 8.9  Carbon Dioxide, Serum 26  Chloride, Serum 105  Creatinine, Serum 1.16  eGFR if  --  eGFR if Non African American --  Glucose, Serum 137  Potassium, Serum 3.7  Protein Total, Serum --  Sodium, Serum 139    01-28-24 @ 21:57  Nessa Aminotransferase(ALT/SGPT)22  Albumin, Serum 4.1  Alkaline Phosphatase, Serum 74  Anion Gap, Serum 8  Aspartate Aminotransferase (AST/SGOT)22  Bilirubin Total, Serum 0.3  Blood Urea Nitrogen, Serum 23  Calcium,Total Serum 9.2  Carbon Dioxide, Serum 27  Chloride, Serum 106  Creatinine, Serum 1.41  eGFR if  --  eGFR if Non African American --  Glucose, Serum 86  Potassium, Serum 4.0  Protein Total, Serum 7.6  Sodium, Serum 141    PT/INR  PT/INR  01-29-24 @ 01:30  INR 1.04  Prothrombin Time Comment --  Prothrobin Time, [id number]  PT/INR  01-28-24 @ 21:57  INR 1.07  Prothrombin Time Comment --  Prothrobin Time, [id number]    Amylase/Lipase    RADIOLOGY & ADDITIONAL TESTS:    Imaging Personally Reviewed:  [ ] YES  [ ] NO

## 2024-01-29 NOTE — DISCHARGE NOTE PROVIDER - NSDCCPTREATMENT_GEN_ALL_CORE_FT
PRINCIPAL PROCEDURE  Procedure: Open reduction and internal fixation (ORIF) of fracture of head of humerus  Findings and Treatment:

## 2024-01-29 NOTE — BRIEF OPERATIVE NOTE - NSICDXBRIEFPREOP_GEN_ALL_CORE_FT
PRE-OP DIAGNOSIS:  Fracture of distal humerus 29-Jan-2024 17:37:05  Dez Boudreaux  
Dr. Neida England
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
## 2024-02-02 DIAGNOSIS — M79.601 PAIN IN RIGHT ARM: ICD-10-CM
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
## 2024-02-02 DIAGNOSIS — N17.9 ACUTE KIDNEY FAILURE, UNSPECIFIED: ICD-10-CM
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
## 2024-02-02 DIAGNOSIS — F90.9 ATTENTION-DEFICIT HYPERACTIVITY DISORDER, UNSPECIFIED TYPE: ICD-10-CM

## 2024-02-02 DIAGNOSIS — S42.411A DISPLACED SIMPLE SUPRACONDYLAR FRACTURE WITHOUT INTERCONDYLAR FRACTURE OF RIGHT HUMERUS, INITIAL ENCOUNTER FOR CLOSED FRACTURE: ICD-10-CM

## (undated) DEVICE — DRILL BIT S&N EVOS SMALL W/AO QC SHORT 2.5MM

## (undated) DEVICE — VENODYNE/SCD SLEEVE CALF MEDIUM

## (undated) DEVICE — PACK TOTAL OPEN SHOULDER

## (undated) DEVICE — DRILL BIT S&N  EVOS SMALL W/AO QC LONG 2.5MM

## (undated) DEVICE — DRILL BIT S&N OVER EVOS W/AO QC SHORT 3.5MM

## (undated) DEVICE — POSITIONING ARTHREX TRIMANO BEACH CHAIR